# Patient Record
Sex: MALE | Race: WHITE | NOT HISPANIC OR LATINO | Employment: FULL TIME | URBAN - METROPOLITAN AREA
[De-identification: names, ages, dates, MRNs, and addresses within clinical notes are randomized per-mention and may not be internally consistent; named-entity substitution may affect disease eponyms.]

---

## 2023-03-20 ENCOUNTER — OFFICE VISIT (OUTPATIENT)
Dept: URGENT CARE | Facility: CLINIC | Age: 28
End: 2023-03-20
Payer: COMMERCIAL

## 2023-03-20 VITALS
HEART RATE: 75 BPM | WEIGHT: 155 LBS | DIASTOLIC BLOOD PRESSURE: 65 MMHG | HEIGHT: 69 IN | OXYGEN SATURATION: 98 % | TEMPERATURE: 98 F | SYSTOLIC BLOOD PRESSURE: 116 MMHG | BODY MASS INDEX: 22.96 KG/M2 | RESPIRATION RATE: 20 BRPM

## 2023-03-20 DIAGNOSIS — S39.012A STRAIN OF LUMBAR REGION, INITIAL ENCOUNTER: Primary | ICD-10-CM

## 2023-03-20 PROCEDURE — 96372 THER/PROPH/DIAG INJ SC/IM: CPT | Mod: S$PBB,,, | Performed by: FAMILY MEDICINE

## 2023-03-20 PROCEDURE — 99213 PR OFFICE/OUTPT VISIT, EST, LEVL III, 20-29 MIN: ICD-10-PCS | Mod: S$PBB,25,, | Performed by: FAMILY MEDICINE

## 2023-03-20 PROCEDURE — 99213 OFFICE O/P EST LOW 20 MIN: CPT | Mod: S$PBB,25,, | Performed by: FAMILY MEDICINE

## 2023-03-20 PROCEDURE — 96372 PR INJECTION,THERAP/PROPH/DIAG2ST, IM OR SUBCUT: ICD-10-PCS | Mod: S$PBB,,, | Performed by: FAMILY MEDICINE

## 2023-03-20 RX ORDER — DEXAMETHASONE SODIUM PHOSPHATE 100 MG/10ML
10 INJECTION INTRAMUSCULAR; INTRAVENOUS
Status: COMPLETED | OUTPATIENT
Start: 2023-03-20 | End: 2023-03-20

## 2023-03-20 RX ORDER — METHOCARBAMOL 500 MG/1
500 TABLET, FILM COATED ORAL NIGHTLY
Qty: 5 TABLET | Refills: 0 | Status: SHIPPED | OUTPATIENT
Start: 2023-03-20 | End: 2023-03-25

## 2023-03-20 RX ORDER — KETOROLAC TROMETHAMINE 30 MG/ML
30 INJECTION, SOLUTION INTRAMUSCULAR; INTRAVENOUS
Status: COMPLETED | OUTPATIENT
Start: 2023-03-20 | End: 2023-03-20

## 2023-03-20 RX ADMIN — KETOROLAC TROMETHAMINE 30 MG: 30 INJECTION, SOLUTION INTRAMUSCULAR; INTRAVENOUS at 02:03

## 2023-03-20 RX ADMIN — DEXAMETHASONE SODIUM PHOSPHATE 10 MG: 100 INJECTION INTRAMUSCULAR; INTRAVENOUS at 02:03

## 2023-03-20 NOTE — PATIENT INSTRUCTIONS
Toradol and Decadron given today.  Discussed side effects.   Careful stretching.  No weight lifting until resolves.    Muscle relaxer at bedtime.   Recheck if not improving.

## 2023-03-20 NOTE — PROGRESS NOTES
"Subjective:       Patient ID: Zen Mcgill is a 27 y.o. male.    Vitals:  height is 5' 9" (1.753 m) and weight is 70.3 kg (155 lb). His temperature is 98.4 °F (36.9 °C). His blood pressure is 116/65 and his pulse is 75. His respiration is 20 and oxygen saturation is 98%.     Chief Complaint: Back Pain (Happened yesterday lifting weights, while squatting, lower back felt liked it rolled in, painful to walk, have back brace on )    Lifting weights with a squatting motion and felt his back roll and pain of lower back and with ambulation since then.  No numbness or tingling. No LOC. No hx of back injury.       Constitution: Negative for fatigue and fever.   Cardiovascular:  Negative for chest pain.   Musculoskeletal:  Positive for pain, trauma, back pain, pain with walking and muscle cramps. Negative for history of spine disorder.   Neurological:  Negative for altered mental status.   Psychiatric/Behavioral:  Negative for altered mental status.      Objective:      Physical Exam   HENT:   Mouth/Throat: Mucous membranes are moist.   Cardiovascular: Normal rate and regular rhythm.   Pulmonary/Chest: Effort normal.   Abdominal: Normal appearance.   Musculoskeletal:      Comments: Pain with anterior flexion and with standing straight, neg SLR, strength 5/5 BLE, no L spine TTP.    Neurological: no focal deficit. He is alert.   Skin: Skin is warm. Capillary refill takes less than 2 seconds.   Psychiatric: His behavior is normal. Mood normal.   Nursing note and vitals reviewed.      Assessment:       1. Strain of lumbar region, initial encounter          Plan:         Strain of lumbar region, initial encounter  -     ketorolac injection 30 mg  -     dexAMETHasone injection 10 mg            Toradol and Decadron given today.  Discussed side effects.          "

## 2023-03-20 NOTE — LETTER
March 20, 2023      [facility] [place] [address]  [place] [address]  Phone: [phone]       Patient: Zen Mcgill   YOB: 1995  Date of Visit: 03/20/2023    To Whom It May Concern:    Katelyn Mcgill  was at [facility] on 03/20/2023. The patient may return to work/school on 04/03/2023 lifting reduced to 10 lbs with restrictions. May do light duties.  If you have any questions or concerns, or if I can be of further assistance, please do not hesitate to contact me.    Sincerely,    FAUSTO LIVE MD